# Patient Record
Sex: MALE | Race: WHITE
[De-identification: names, ages, dates, MRNs, and addresses within clinical notes are randomized per-mention and may not be internally consistent; named-entity substitution may affect disease eponyms.]

---

## 2020-09-02 ENCOUNTER — HOSPITAL ENCOUNTER (OUTPATIENT)
Dept: HOSPITAL 92 - BICMRI | Age: 40
Discharge: HOME | End: 2020-09-02
Attending: FAMILY MEDICINE
Payer: COMMERCIAL

## 2020-09-02 DIAGNOSIS — M47.816: ICD-10-CM

## 2020-09-02 DIAGNOSIS — M54.40: Primary | ICD-10-CM

## 2020-09-02 PROCEDURE — 72148 MRI LUMBAR SPINE W/O DYE: CPT

## 2020-09-02 NOTE — MRI
MRI OF THE LUMBAR SPINE WITHOUT CONTRAST:

9/2/20

 

COMPARISON:  

None. 

 

HISTORY: 

Chronic back pain, worsening recently, falls, radiculopathy.

 

TECHNIQUE:  

Multiplanar and multisequence MR imaging of the lumbar spine provided without contrast. 

 

FINDINGS: 

The sagittal STIR imaging demonstrates no focal areas of osseous marrow edema. On the basis of five l
umbar type vertebral bodies, the conus medullaris terminates at the L1 level. 

 

T12-L1: Unremarkable. 

 

L1-2: There is disc space narrowing with disc desiccation. There is a small right paracentral/right f
oraminal disc protrusion. There is mild bilateral facet hypertrophy. No significant central canal or 
neural foraminal stenosis. 

 

L2-3: There is a small foraminal disc protrusion on the left. No significant central canal or neural 
foraminal stenosis. 

 

L3-4: There is a small left foraminal and post foraminal disc protrusion with minimal left neural for
aminal stenosis. No significant central canal or right neural foraminal stenosis.

 

L4-5: There is disc space narrowing with disc desiccation and disc bulge. There is a central annular 
tear. Bilateral facet hypertrophy noted, right greater than left. There is mild central canal stenosi
s and mild right neural foraminal stenosis. 

 

L5-S1:  There id disc space narrowing with disc desiccation. There is a foraminal/left paracentral di
sc protrusion which abuts the ventral aspect of the left S1 nerve root and leads to a mild degree of 
left lateral recess stenosis. There is moderate left and neural foraminal stenosis on the basis of fa
cet hypertrophy and disc protrusion. No significant right neural foraminal stenosis.

 

The visualized retroperitoneal structures appear grossly unremarkable. 

 

IMPRESSION: 

Multilevel degenerative change within the lumbar spine as detailed above. 

 

POS: [facility]